# Patient Record
Sex: MALE | Race: WHITE | NOT HISPANIC OR LATINO | Employment: FULL TIME | URBAN - NONMETROPOLITAN AREA
[De-identification: names, ages, dates, MRNs, and addresses within clinical notes are randomized per-mention and may not be internally consistent; named-entity substitution may affect disease eponyms.]

---
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
## 2019-05-21 PROBLEM — S06.0X1A CONCUSSION WTH LOSS OF CONSCIOUSNESS OF 30 MINUTES OR LESS: Status: ACTIVE | Noted: 2019-05-21

## 2019-05-21 PROBLEM — S42.021A CLOSED DISPLACED FRACTURE OF SHAFT OF RIGHT CLAVICLE: Status: ACTIVE | Noted: 2019-05-21

## 2019-05-21 PROBLEM — R31.0 GROSS HEMATURIA: Status: ACTIVE | Noted: 2019-05-21

## 2023-04-17 ENCOUNTER — OFFICE VISIT (OUTPATIENT)
Dept: URGENT CARE | Facility: CLINIC | Age: 33
End: 2023-04-17
Payer: COMMERCIAL

## 2023-04-17 VITALS
SYSTOLIC BLOOD PRESSURE: 118 MMHG | RESPIRATION RATE: 18 BRPM | HEART RATE: 67 BPM | HEIGHT: 69 IN | BODY MASS INDEX: 25.18 KG/M2 | OXYGEN SATURATION: 98 % | TEMPERATURE: 98.6 F | WEIGHT: 170 LBS | DIASTOLIC BLOOD PRESSURE: 70 MMHG

## 2023-04-17 DIAGNOSIS — H66.001 NON-RECURRENT ACUTE SUPPURATIVE OTITIS MEDIA OF RIGHT EAR WITHOUT SPONTANEOUS RUPTURE OF TYMPANIC MEMBRANE: ICD-10-CM

## 2023-04-17 PROCEDURE — 99213 OFFICE O/P EST LOW 20 MIN: CPT | Performed by: PHYSICIAN ASSISTANT

## 2023-04-17 RX ORDER — FLUTICASONE PROPIONATE 50 MCG
1 SPRAY, SUSPENSION (ML) NASAL DAILY
Qty: 16 G | Refills: 0 | Status: SHIPPED | OUTPATIENT
Start: 2023-04-17

## 2023-04-17 RX ORDER — AMOXICILLIN AND CLAVULANATE POTASSIUM 875; 125 MG/1; MG/1
1 TABLET, FILM COATED ORAL 2 TIMES DAILY
Qty: 14 TABLET | Refills: 0 | Status: SHIPPED | OUTPATIENT
Start: 2023-04-17 | End: 2023-04-24

## 2023-04-17 ASSESSMENT — ENCOUNTER SYMPTOMS
EYE PAIN: 0
EYE REDNESS: 0
DIZZINESS: 0
DIAPHORESIS: 0
SHORTNESS OF BREATH: 0
NAUSEA: 0
FEVER: 0
HEADACHES: 0
CHILLS: 0
EYE DISCHARGE: 0
DIARRHEA: 0
SINUS PAIN: 0
COUGH: 0
SORE THROAT: 0
CONSTIPATION: 0
WHEEZING: 0
VOMITING: 0
ABDOMINAL PAIN: 0

## 2023-04-17 ASSESSMENT — FIBROSIS 4 INDEX: FIB4 SCORE: 0.69
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
## 2023-04-26 ENCOUNTER — OFFICE VISIT (OUTPATIENT)
Dept: URGENT CARE | Facility: CLINIC | Age: 33
End: 2023-04-26
Payer: COMMERCIAL

## 2023-04-26 VITALS
BODY MASS INDEX: 25.33 KG/M2 | RESPIRATION RATE: 16 BRPM | HEART RATE: 78 BPM | HEIGHT: 69 IN | OXYGEN SATURATION: 100 % | WEIGHT: 171 LBS | DIASTOLIC BLOOD PRESSURE: 76 MMHG | SYSTOLIC BLOOD PRESSURE: 116 MMHG | TEMPERATURE: 98.7 F

## 2023-04-26 DIAGNOSIS — H65.191 ACUTE EFFUSION OF RIGHT EAR: ICD-10-CM

## 2023-04-26 DIAGNOSIS — H66.004 RECURRENT ACUTE SUPPURATIVE OTITIS MEDIA OF RIGHT EAR WITHOUT SPONTANEOUS RUPTURE OF TYMPANIC MEMBRANE: ICD-10-CM

## 2023-04-26 DIAGNOSIS — H92.01 RIGHT EAR PAIN: ICD-10-CM

## 2023-04-26 PROCEDURE — 99213 OFFICE O/P EST LOW 20 MIN: CPT | Performed by: NURSE PRACTITIONER

## 2023-04-26 RX ORDER — METHYLPREDNISOLONE 4 MG/1
TABLET ORAL
Qty: 21 TABLET | Refills: 0 | Status: SHIPPED | OUTPATIENT
Start: 2023-04-26

## 2023-04-26 RX ORDER — CEFDINIR 300 MG/1
300 CAPSULE ORAL 2 TIMES DAILY
Qty: 20 CAPSULE | Refills: 0 | Status: SHIPPED | OUTPATIENT
Start: 2023-04-26 | End: 2023-05-06

## 2023-04-26 ASSESSMENT — ENCOUNTER SYMPTOMS
FEVER: 0
DIZZINESS: 0
SORE THROAT: 0
COUGH: 0

## 2023-04-26 ASSESSMENT — FIBROSIS 4 INDEX: FIB4 SCORE: 0.69
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
## 2023-04-26 NOTE — PATIENT INSTRUCTIONS
-Take antibiotic as directed.  -Oral hydration.  -Ibuprofen or tylenol as directed for pain and/or fevers.   -Over the counter antihistamine (cetrizine or benadryl).  -Follow up with primary care provider.    Follow up for failure to improve, worsening symptoms, persistent fevers, ear drainage, persistent or increased pain, severe headache or stiff neck, hearing changes, or any other concerns.

## 2023-07-13 ENCOUNTER — APPOINTMENT (OUTPATIENT)
Dept: RADIOLOGY | Facility: IMAGING CENTER | Age: 33
End: 2023-07-13
Attending: STUDENT IN AN ORGANIZED HEALTH CARE EDUCATION/TRAINING PROGRAM
Payer: COMMERCIAL

## 2023-07-13 ENCOUNTER — OFFICE VISIT (OUTPATIENT)
Dept: URGENT CARE | Facility: CLINIC | Age: 33
End: 2023-07-13
Payer: COMMERCIAL

## 2023-07-13 VITALS
TEMPERATURE: 97.6 F | WEIGHT: 170.6 LBS | HEIGHT: 69 IN | OXYGEN SATURATION: 97 % | HEART RATE: 72 BPM | BODY MASS INDEX: 25.27 KG/M2 | DIASTOLIC BLOOD PRESSURE: 62 MMHG | RESPIRATION RATE: 14 BRPM | SYSTOLIC BLOOD PRESSURE: 102 MMHG

## 2023-07-13 DIAGNOSIS — S69.92XA INJURY OF LEFT INDEX FINGER, INITIAL ENCOUNTER: ICD-10-CM

## 2023-07-13 DIAGNOSIS — H66.92 ACUTE LEFT OTITIS MEDIA: ICD-10-CM

## 2023-07-13 DIAGNOSIS — S60.451A FOREIGN BODY OF LEFT INDEX FINGER: ICD-10-CM

## 2023-07-13 PROCEDURE — 99214 OFFICE O/P EST MOD 30 MIN: CPT | Performed by: STUDENT IN AN ORGANIZED HEALTH CARE EDUCATION/TRAINING PROGRAM

## 2023-07-13 PROCEDURE — 73140 X-RAY EXAM OF FINGER(S): CPT | Mod: TC,LT | Performed by: STUDENT IN AN ORGANIZED HEALTH CARE EDUCATION/TRAINING PROGRAM

## 2023-07-13 PROCEDURE — 3078F DIAST BP <80 MM HG: CPT | Performed by: STUDENT IN AN ORGANIZED HEALTH CARE EDUCATION/TRAINING PROGRAM

## 2023-07-13 PROCEDURE — 3074F SYST BP LT 130 MM HG: CPT | Performed by: STUDENT IN AN ORGANIZED HEALTH CARE EDUCATION/TRAINING PROGRAM

## 2023-07-13 RX ORDER — AMOXICILLIN AND CLAVULANATE POTASSIUM 875; 125 MG/1; MG/1
1 TABLET, FILM COATED ORAL 2 TIMES DAILY
Qty: 14 TABLET | Refills: 0 | Status: SHIPPED | OUTPATIENT
Start: 2023-07-13 | End: 2023-07-20

## 2023-07-13 ASSESSMENT — FIBROSIS 4 INDEX: FIB4 SCORE: 0.71

## 2023-07-13 NOTE — PROGRESS NOTES
Subjective:   Get Carroll is a 33 y.o. male who presents for Otalgia (L ear pain , muffled x 4 days )      HPI:  Pleasant 33-year-old male presents the urgent care with 2 different complaints.  First issue is 4 days of left ear pain.  Denies any recent swimming or trauma.  He states that it feels like an achy sensation.  He does report having a couple different ear infections over the past year.  Denies any history of ear infections prior to this except for when he was a kid.  His other issue is reduced range of motion without known injury to his left index finger.  States that his index finger feels swollen and tight when he tries to make a fist.  He states that he cannot fully make a fist.  He has not used anything for symptoms at this time.  Denies any past history of similar issues.  No history of trigger finger.  Denies his finger ever catching and releasing.  No history of Dupuytren's contractures.  He does report tingling to the entirety of the finger but is still able to feel.  No burning, laceration, crush injury, for hearing loss, tinnitus, ear discharge, sore throat, nasal congestion, sinus pressure, or sinus pain.    Medications:    fluticasone  ibuprofen Tabs  methylPREDNISolone Tbpk    Allergies: Latex and Milk products food allergy    Problem List: Get Carroll does not have any pertinent problems on file.    Surgical History:  Past Surgical History:   Procedure Laterality Date    PB OPEN TREATMENT CLAVICULAR FRACTURE INTERNAL * Right 5/28/2019    Procedure: OPEN REDUCTION INTERNAL FIXATION RIGHT CLAVICLE FRACTURE;  Surgeon: Blayne Archer M.D.;  Location: SURGERY [facility];  Service: Orthopedics    ORIF, FRACTURE, CLAVICLE Left        Past Social Hx: Get Carroll  reports that he has never smoked. He has never used smokeless tobacco. He reports current alcohol use. He reports that he does not use drugs.     Past Family Hx:  Get Carroll family history is not on file.     Problem list,  "medications, and allergies reviewed by myself today in Epic.     Objective:     /62 (BP Location: Left arm, Patient Position: Sitting, BP Cuff Size: Adult)   Pulse 72   Temp 36.4 °C (97.6 °F) (Temporal)   Resp 14   Ht 1.753 m (5' 9\")   Wt 77.4 kg (170 lb 9.6 oz)   SpO2 97%   BMI 25.19 kg/m²     Physical Exam  Vitals reviewed.   Constitutional:       General: He is not in acute distress.     Appearance: Normal appearance.   HENT:      Head: Normocephalic.      Right Ear: Hearing, tympanic membrane, ear canal and external ear normal.      Left Ear: Hearing, ear canal and external ear normal. No drainage.  No middle ear effusion. No mastoid tenderness. Tympanic membrane is injected and erythematous. Tympanic membrane is not perforated or bulging.      Ears:      Comments: Left ear: No tragal tenderness present.  No erythema or swelling to the ear canal.     Nose: Nose normal. No congestion or rhinorrhea.      Mouth/Throat:      Mouth: Mucous membranes are moist.      Pharynx: No oropharyngeal exudate or posterior oropharyngeal erythema.   Eyes:      Conjunctiva/sclera: Conjunctivae normal.      Pupils: Pupils are equal, round, and reactive to light.   Cardiovascular:      Rate and Rhythm: Normal rate.      Pulses: Normal pulses.   Pulmonary:      Effort: Pulmonary effort is normal.   Musculoskeletal:      Comments: Left index finger: No discernible swelling to the left index finger when compared to the right side.  No erythema or ecchymosis.  No paronychia.  Reduced active range of motion with making a fist.  Able to fully extend the index finger without pain.  Passive full extension without pain.  No pain to the flexor tendon distribution with palpation.  He does report some TTP to the dorsal aspect of the DIP joint and PIP joint.  Finger is not catching during passive range of motion.  I am able to fully move the finger through its passive range of motion.  No palpable cords/Dupuytren contracture seen on " exam.  Cap refill less than 2 seconds.  Subjective reduced sensation to the full distribution of the finger.  2+ radial pulse.  Normal temperature to the finger.  Normal coloration.  No pain or swelling to the pad of the finger.  No laceration or wound.   Skin:     General: Skin is warm and dry.   Neurological:      Mental Status: He is alert.         Assessment/Plan:     Diagnosis and associated orders:     1. Acute left otitis media  amoxicillin-clavulanate (AUGMENTIN) 875-125 MG Tab      2. Injury of left index finger, initial encounter  DX-FINGER(S) 2+ LEFT    Referral to Hand Surgery      3. Foreign body of left index finger  Referral to Hand Surgery         Comments/MDM:     Patient's first issue is acute left otitis media.  We will treat this with Augmentin.  This does appear to be early infection without significant middle ear effusion or bulging to the TM.  No perforation.  No otitis externa.  Patient denies any known allergies to Augmentin.    Second issue is acute reduced range of motion to the left index finger without known cause.  He states that over the past 24 hours he has had reduced range of motion with the left index finger with regards to making a full fist.  He denies any trauma or injury.  Physical exam shows no obvious trigger finger or signs of Dupuytren's contracture.  No swelling or erythema to the finger.  No tenderness over the flexor tendon.  Physical exam is not consistent with infectious tenosynovitis.  X-ray shows no acute trauma or injury.  There is a small punctate foreign body to the base of the second finger on the radial aspect.  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  Palpation to the area does not exhibit any tenderness.  Given unclear etiology we will refer to orthopedics for further evaluation.  Discussed signs of infectious tenosynovitis.  If any of these signs occur including increasing swelling, redness, pain  with passive extension, fever, or acute worsening of symptoms he should present to the ED.  He is neurovascular intact at this time.  Good pulses and cap refill.  He does have some subjective numbness but no change in coloration and normal temperature in the finger.         Differential diagnosis, natural history, supportive care, and indications for immediate follow-up discussed.    Advised the patient to follow-up with the primary care physician for recheck, reevaluation, and consideration of further management.    Please note that this dictation was created using voice recognition software. I have made a reasonable attempt to correct obvious errors, but I expect that there are errors of grammar and possibly content that I did not discover before finalizing the note.    Electronically signed by Viraj Cooper PA-C.

## 2023-08-09 ENCOUNTER — HOSPITAL ENCOUNTER (OUTPATIENT)
Facility: MEDICAL CENTER | Age: 33
End: 2023-08-09
Attending: PREVENTIVE MEDICINE
Payer: COMMERCIAL

## 2023-08-09 ENCOUNTER — NON-PROVIDER VISIT (OUTPATIENT)
Dept: OCCUPATIONAL MEDICINE | Facility: CLINIC | Age: 33
End: 2023-08-09

## 2023-08-09 ENCOUNTER — OFFICE VISIT (OUTPATIENT)
Dept: OCCUPATIONAL MEDICINE | Facility: CLINIC | Age: 33
End: 2023-08-09

## 2023-08-09 DIAGNOSIS — Z01.89 RESPIRATORY CLEARANCE EXAMINATION, ENCOUNTER FOR: ICD-10-CM

## 2023-08-09 DIAGNOSIS — Z02.89 ENCOUNTER FOR OCCUPATIONAL HEALTH ASSESSMENT: ICD-10-CM

## 2023-08-09 DIAGNOSIS — Z02.89 ENCOUNTER FOR OCCUPATIONAL HEALTH ASSESSMENT: Primary | ICD-10-CM

## 2023-08-09 PROCEDURE — 8916 PR CLEARANCE ONLY: Performed by: NURSE PRACTITIONER

## 2023-08-11 LAB — LEAD BLDV-MCNC: <2 UG/DL

## 2023-08-28 ENCOUNTER — OFFICE VISIT (OUTPATIENT)
Dept: URGENT CARE | Facility: CLINIC | Age: 33
End: 2023-08-28
Payer: COMMERCIAL

## 2023-08-28 ENCOUNTER — APPOINTMENT (OUTPATIENT)
Dept: RADIOLOGY | Facility: IMAGING CENTER | Age: 33
End: 2023-08-28
Attending: NURSE PRACTITIONER
Payer: COMMERCIAL

## 2023-08-28 VITALS
HEIGHT: 69 IN | RESPIRATION RATE: 18 BRPM | HEART RATE: 86 BPM | WEIGHT: 167 LBS | BODY MASS INDEX: 24.73 KG/M2 | TEMPERATURE: 97.6 F | DIASTOLIC BLOOD PRESSURE: 76 MMHG | SYSTOLIC BLOOD PRESSURE: 126 MMHG | OXYGEN SATURATION: 98 %

## 2023-08-28 DIAGNOSIS — S61.215A LACERATION OF LEFT RING FINGER WITHOUT DAMAGE TO NAIL, FOREIGN BODY PRESENCE UNSPECIFIED, INITIAL ENCOUNTER: ICD-10-CM

## 2023-08-28 DIAGNOSIS — S69.92XA INJURY OF FINGER OF LEFT HAND, INITIAL ENCOUNTER: ICD-10-CM

## 2023-08-28 PROCEDURE — 90471 IMMUNIZATION ADMIN: CPT | Performed by: NURSE PRACTITIONER

## 2023-08-28 PROCEDURE — 12001 RPR S/N/AX/GEN/TRNK 2.5CM/<: CPT | Mod: F3 | Performed by: NURSE PRACTITIONER

## 2023-08-28 PROCEDURE — 73140 X-RAY EXAM OF FINGER(S): CPT | Mod: TC,LT | Performed by: NURSE PRACTITIONER

## 2023-08-28 PROCEDURE — 90715 TDAP VACCINE 7 YRS/> IM: CPT | Performed by: NURSE PRACTITIONER

## 2023-08-28 PROCEDURE — 99213 OFFICE O/P EST LOW 20 MIN: CPT | Mod: 25 | Performed by: NURSE PRACTITIONER

## 2023-08-28 PROCEDURE — 3078F DIAST BP <80 MM HG: CPT | Performed by: NURSE PRACTITIONER

## 2023-08-28 PROCEDURE — 3074F SYST BP LT 130 MM HG: CPT | Performed by: NURSE PRACTITIONER

## 2023-08-28 RX ORDER — CEPHALEXIN 500 MG/1
500 CAPSULE ORAL 2 TIMES DAILY
Qty: 10 CAPSULE | Refills: 0 | Status: SHIPPED | OUTPATIENT
Start: 2023-08-28 | End: 2023-09-02

## 2023-08-28 NOTE — PROGRESS NOTES
Date: 08/28/23     Chief Complaint:    Chief Complaint   Patient presents with    Laceration     Left hand ring finger x 2 hours         History of Present Illness: 33 y.o.  male presents to clinic with left ring finger laceration that happened 2 hours ago.  Patient states he was working on a  and did accidentally cut his finger.  He states he feels the laceration is pretty deep.  He does not believe he is up-to-date on his tetanus vaccination.  He denies any decreased range of motion or numbness or tingling distally.      ROS:    As stated in HPI     Medical/SX/ Social History:  Reviewed per chart    Pertinent Medications:    Current Outpatient Medications on File Prior to Visit   Medication Sig Dispense Refill    methylPREDNISolone (MEDROL DOSEPAK) 4 MG Tablet Therapy Pack Follow schedule on package instructions. (Patient not taking: Reported on 7/13/2023) 21 Tablet 0    fluticasone (FLONASE) 50 MCG/ACT nasal spray Administer 1 Spray into affected nostril(S) every day. (Patient not taking: Reported on 8/28/2023) 16 g 0    ibuprofen (MOTRIN) 800 MG Tab Take 1 Tab by mouth every 8 hours as needed. (Patient not taking: Reported on 8/28/2023) 30 Tab 3     No current facility-administered medications on file prior to visit.        Allergies:    Latex and Milk products food allergy     Problem list, medications, and allergies reviewed by myself today in Epic     Physical Exam:    Vitals:    08/28/23 1443   BP: 126/76   Pulse: 86   Resp: 18   Temp: 36.4 °C (97.6 °F)   SpO2: 98%             Physical Exam  Constitutional:       Appearance: Normal appearance.   HENT:      Head: Normocephalic and atraumatic.   Musculoskeletal:      Comments: Laceration to the left fourth finger overlying the PIP.  Actively bleeding.  Distal neurovascular status is grossly intact with cap refill less than 3 seconds.  Extensor tendons intact to resistance.  Range of motion intact.   Neurological:      Mental Status: He is alert.             LACERATION PROCEDURE NOTE:    Procedure explained and verbal consent obtained. The wound was anesthetized with  1.5 mL of lidocaine without epi, with good anesthesia. Sterile drape and prep were done. Copious irrigation was done with saline and the wound was explored. Length of wound was visualized at approximately: 2.5 cm. There was no visualized foreign body or deep structure injury noted. Wound was examined under range of motion, range of motion intact. Wound edges were approximated with good alignment using sutures.  4 sutures were placed using 5-0 Ethilon.  Nonstick dressing applied. Patient tolerated procedure well with no complications.       Diagnostics:      DX-FINGER(S) 2+ LEFT    Result Date: 8/28/2023 8/28/2023 2:55 PM HISTORY/REASON FOR EXAM:  Pain/Deformity Following Trauma; gribder vs finger pip. . TECHNIQUE/EXAM DESCRIPTION AND NUMBER OF VIEWS:  3 views of the LEFT fingers. COMPARISON: 7/13/2023 FINDINGS: There is no fracture or dislocation. The visualized osseous structures are in anatomic alignment. The joint spaces are grossly preserved. Bone mineralization is age-appropriate.. There is a punctate foreign body within the base of the second finger in the soft tissues along the radial aspect.     No acute osseous abnormality of the ring finger or radiopaque foreign body.      Diagnostics interpreted by myself.      Medical Decision making and plan :  I personally reviewed prior external notes and test results pertinent to today's visit. Pt is clinically stable at today's acute urgent care visit.  Patient appears nontoxic with no acute distress noted. Appropriate for outpatient care at this time. The patient remained stable during the urgent care visit.     Pleasant 33 y.o. male presented clinic with with a laceration to his left fourth finger.  Due to the nature of the injury did obtain x-ray imaging to ensure no radiopaque foreign body as well as bony injury.  X-ray did show no acute osseous  abnormality of the left fourth finger.  Did show punctuate foreign body at the base of the second finger although this is known.  Using shared decision making did close laceration using stitches.  Discussed appropriate wound care return to clinic in 7 to 10 days for removal.  Shared decision-making was utilized with patient for treatment plan.  Differential Diagnosis, natural history, and supportive care discussed.      1. Laceration of left ring finger without damage to nail, foreign body presence unspecified, initial encounter    - Tdap =>6yo IM  - cephALEXin (KEFLEX) 500 MG Cap; Take 1 Capsule by mouth 2 times a day for 5 days.  Dispense: 10 Capsule; Refill: 0    2. Injury of finger of left hand, initial encounter    - DX-FINGER(S) 2+ LEFT        Medication discussed included indication for use and the potential benefits and side effects. Education was provided regarding the aforementioned assessments.  All of the patient's questions were answered to their satisfaction at the time of discharge. Patient was encouraged to monitor symptoms closely. Those signs and symptoms which would warrant concern and mandate seeking a higher level of service through the emergency department discussed at length.  Patient stated agreement and understanding of this plan of care.    Disposition:  Home in stable condition       Voice Recognition Disclaimer:  Portions of this document were created using voice recognition software. The software does have a chance of producing errors of grammar and possibly content. I have made every reasonable attempt to correct obvious errors, but there may be errors of grammar and possibly content that I did not discover before finalizing the documentation.    OTILIA Lew.

## 2023-09-19 ENCOUNTER — OFFICE VISIT (OUTPATIENT)
Dept: URGENT CARE | Facility: CLINIC | Age: 33
End: 2023-09-19
Payer: COMMERCIAL

## 2023-09-19 VITALS
WEIGHT: 166 LBS | HEIGHT: 69 IN | TEMPERATURE: 97.9 F | SYSTOLIC BLOOD PRESSURE: 118 MMHG | OXYGEN SATURATION: 96 % | DIASTOLIC BLOOD PRESSURE: 68 MMHG | HEART RATE: 90 BPM | RESPIRATION RATE: 18 BRPM | BODY MASS INDEX: 24.59 KG/M2

## 2023-09-19 DIAGNOSIS — L72.0 EPIDERMOID CYST OF FINGER OF LEFT HAND: ICD-10-CM

## 2023-09-19 DIAGNOSIS — L08.9 FINGER INFECTION: ICD-10-CM

## 2023-09-19 PROCEDURE — 3078F DIAST BP <80 MM HG: CPT | Performed by: NURSE PRACTITIONER

## 2023-09-19 PROCEDURE — 3074F SYST BP LT 130 MM HG: CPT | Performed by: NURSE PRACTITIONER

## 2023-09-19 PROCEDURE — 99213 OFFICE O/P EST LOW 20 MIN: CPT | Performed by: NURSE PRACTITIONER

## 2023-09-19 RX ORDER — DOXYCYCLINE HYCLATE 100 MG
100 TABLET ORAL 2 TIMES DAILY
Qty: 14 TABLET | Refills: 0 | Status: SHIPPED | OUTPATIENT
Start: 2023-09-19 | End: 2023-09-26

## 2023-09-20 NOTE — PROGRESS NOTES
Date: 09/19/23     Chief Complaint:    Chief Complaint   Patient presents with    Hand Injury     Left hand , ring finger         History of Present Illness: 33 y.o.  male presents to clinic with concerns for infection of his left ring finger.  Patient was seen approximately 3 weeks ago for a laceration of his left ring finger.  He obtained sutures.  He states that about day 6 the sutures did come out on their own.  He did take the prophylactic antibiotics as prescribed he did finish them in their entirety.  He states over the past 5 days he has noticed increased swelling, pain to palpation and redness.  He denies any fever or body aches.  He denies any numbness or tingling distally.      ROS:    As stated in HPI     Medical/SX/ Social History:  Reviewed per chart    Pertinent Medications:    Current Outpatient Medications on File Prior to Visit   Medication Sig Dispense Refill    methylPREDNISolone (MEDROL DOSEPAK) 4 MG Tablet Therapy Pack Follow schedule on package instructions. (Patient not taking: Reported on 7/13/2023) 21 Tablet 0    fluticasone (FLONASE) 50 MCG/ACT nasal spray Administer 1 Spray into affected nostril(S) every day. (Patient not taking: Reported on 8/28/2023) 16 g 0    ibuprofen (MOTRIN) 800 MG Tab Take 1 Tab by mouth every 8 hours as needed. (Patient not taking: Reported on 8/28/2023) 30 Tab 3     No current facility-administered medications on file prior to visit.        Allergies:    Latex and Milk products food allergy     Problem list, medications, and allergies reviewed by myself today in Epic     Physical Exam:    Vitals:    09/19/23 1716   BP: 118/68   Pulse: 90   Resp: 18   Temp: 36.6 °C (97.9 °F)   SpO2: 96%             Physical Exam  Constitutional:       Appearance: Normal appearance.   HENT:      Head: Normocephalic and atraumatic.   Musculoskeletal:        Hands:    Neurological:      Mental Status: He is alert.            Medical Decision making and plan :  I personally reviewed  prior external notes and test results pertinent to today's visit. Pt is clinically stable at today's acute urgent care visit.  Patient appears nontoxic with no acute distress noted. Appropriate for outpatient care at this time. The patient remained stable during the urgent care visit.     Pleasant 33 y.o. male presented clinic with concerns for infection 3 weeks post laceration repair.  Patient did undergo x-ray imaging at that time there was no foreign body.  Shared decision-making was utilized with patient for treatment plan.  Differential Diagnosis, natural history, and supportive care discussed.  Swelling is very fluctuant in nature which is concerning for possible abscess.  Did discuss with patient that it is common to form cysts over the finger joints as well after trauma.  Although on exam the swelling does not feel rubbery in nature.  Patient does wish to have the fluid removed in clinic.  Using shared decision making I did cleanse the area thoroughly.  Using an 18-gauge needle I did advance the needle into the most fluctuant area.  When attempting to pull back on the syringe minimal clear fluid was drained.  Once the needle was removed clear fluid did drain although there is still significant fluctuance noted.  Discussed with patient that the swelling although I do group agree that it is infected and needs antibiotics is likely secondary to a fluid-filled cyst.  Advised that he would need to have the full encapsulated cyst removed to decrease the chance of recurrence.  Patient has already had 5 days of Keflex, did send for 7 days of doxycycline.  Advised to finish antibiotics in their entirety.    1. Finger infection    - doxycycline (VIBRAMYCIN) 100 MG Tab; Take 1 Tablet by mouth 2 times a day for 7 days.  Dispense: 14 Tablet; Refill: 0    2. Epidermoid cyst of finger of left hand    - Referral to General Surgery      Medication discussed included indication for use and the potential benefits and side  effects. Education was provided regarding the aforementioned assessments.  All of the patient's questions were answered to their satisfaction at the time of discharge. Patient was encouraged to monitor symptoms closely. Those signs and symptoms which would warrant concern and mandate seeking a higher level of service through the emergency department discussed at length.  Patient stated agreement and understanding of this plan of care.    Disposition:  Home in stable condition       Voice Recognition Disclaimer:  Portions of this document were created using voice recognition software. The software does have a chance of producing errors of grammar and possibly content. I have made every reasonable attempt to correct obvious errors, but there may be errors of grammar and possibly content that I did not discover before finalizing the documentation.    OTILIA Lew.

## 2024-03-30 ENCOUNTER — OFFICE VISIT (OUTPATIENT)
Dept: URGENT CARE | Facility: CLINIC | Age: 34
End: 2024-03-30
Payer: COMMERCIAL

## 2024-03-30 VITALS
HEIGHT: 69 IN | OXYGEN SATURATION: 99 % | HEART RATE: 84 BPM | DIASTOLIC BLOOD PRESSURE: 68 MMHG | WEIGHT: 155 LBS | RESPIRATION RATE: 16 BRPM | BODY MASS INDEX: 22.96 KG/M2 | TEMPERATURE: 98.8 F | SYSTOLIC BLOOD PRESSURE: 110 MMHG

## 2024-03-30 DIAGNOSIS — L03.011 PARONYCHIA OF RIGHT MIDDLE FINGER: ICD-10-CM

## 2024-03-30 RX ORDER — CEPHALEXIN 500 MG/1
500 CAPSULE ORAL 4 TIMES DAILY
Qty: 28 CAPSULE | Refills: 0 | Status: SHIPPED | OUTPATIENT
Start: 2024-03-30 | End: 2024-04-06

## 2024-03-30 ASSESSMENT — ENCOUNTER SYMPTOMS
CONSTITUTIONAL NEGATIVE: 1
FEVER: 0
NEUROLOGICAL NEGATIVE: 1
ROS SKIN COMMENTS: PER HPI
CHILLS: 0
MUSCULOSKELETAL NEGATIVE: 1

## 2024-03-30 ASSESSMENT — VISUAL ACUITY: OU: 1
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
## 2024-04-01 ENCOUNTER — OFFICE VISIT (OUTPATIENT)
Dept: URGENT CARE | Facility: CLINIC | Age: 34
End: 2024-04-01
Payer: COMMERCIAL

## 2024-04-01 ENCOUNTER — HOSPITAL ENCOUNTER (OUTPATIENT)
Facility: MEDICAL CENTER | Age: 34
End: 2024-04-01
Attending: REGISTERED NURSE
Payer: COMMERCIAL

## 2024-04-01 VITALS
TEMPERATURE: 97.7 F | BODY MASS INDEX: 22.96 KG/M2 | DIASTOLIC BLOOD PRESSURE: 68 MMHG | WEIGHT: 155 LBS | RESPIRATION RATE: 16 BRPM | OXYGEN SATURATION: 97 % | HEIGHT: 69 IN | SYSTOLIC BLOOD PRESSURE: 118 MMHG | HEART RATE: 83 BPM

## 2024-04-01 DIAGNOSIS — L03.011 PARONYCHIA OF RIGHT MIDDLE FINGER: ICD-10-CM

## 2024-04-01 PROCEDURE — 87077 CULTURE AEROBIC IDENTIFY: CPT

## 2024-04-01 PROCEDURE — 87186 SC STD MICRODIL/AGAR DIL: CPT

## 2024-04-01 PROCEDURE — 3078F DIAST BP <80 MM HG: CPT | Performed by: REGISTERED NURSE

## 2024-04-01 PROCEDURE — 87070 CULTURE OTHR SPECIMN AEROBIC: CPT

## 2024-04-01 PROCEDURE — 3074F SYST BP LT 130 MM HG: CPT | Performed by: REGISTERED NURSE

## 2024-04-01 PROCEDURE — 87205 SMEAR GRAM STAIN: CPT

## 2024-04-01 PROCEDURE — 87147 CULTURE TYPE IMMUNOLOGIC: CPT

## 2024-04-01 PROCEDURE — 99214 OFFICE O/P EST MOD 30 MIN: CPT | Performed by: REGISTERED NURSE

## 2024-04-01 ASSESSMENT — ENCOUNTER SYMPTOMS
CHILLS: 0
FEVER: 0
MYALGIAS: 0

## 2024-04-02 DIAGNOSIS — L03.011 PARONYCHIA OF RIGHT MIDDLE FINGER: ICD-10-CM
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
## 2024-04-04 LAB
GRAM STN SPEC: NORMAL
SIGNIFICANT IND 70042: NORMAL
SITE SITE: NORMAL
SOURCE SOURCE: NORMAL

## 2024-04-05 LAB
BACTERIA WND AEROBE CULT: ABNORMAL
BACTERIA WND AEROBE CULT: ABNORMAL
GRAM STN SPEC: ABNORMAL
SIGNIFICANT IND 70042: ABNORMAL
SITE SITE: ABNORMAL
SOURCE SOURCE: ABNORMAL

## 2024-12-21 ENCOUNTER — OFFICE VISIT (OUTPATIENT)
Dept: URGENT CARE | Facility: CLINIC | Age: 34
End: 2024-12-21
Payer: COMMERCIAL

## 2024-12-21 VITALS
OXYGEN SATURATION: 97 % | TEMPERATURE: 98.8 F | WEIGHT: 155 LBS | SYSTOLIC BLOOD PRESSURE: 118 MMHG | HEIGHT: 69 IN | DIASTOLIC BLOOD PRESSURE: 52 MMHG | BODY MASS INDEX: 22.96 KG/M2 | RESPIRATION RATE: 16 BRPM | HEART RATE: 87 BPM

## 2024-12-21 DIAGNOSIS — J11.1 INFLUENZA: ICD-10-CM

## 2024-12-21 DIAGNOSIS — R68.89 FLU-LIKE SYMPTOMS: ICD-10-CM

## 2024-12-21 LAB
FLUAV RNA SPEC QL NAA+PROBE: POSITIVE
FLUBV RNA SPEC QL NAA+PROBE: NEGATIVE
RSV RNA SPEC QL NAA+PROBE: NEGATIVE
SARS-COV-2 RNA RESP QL NAA+PROBE: NEGATIVE

## 2024-12-21 PROCEDURE — 99213 OFFICE O/P EST LOW 20 MIN: CPT | Performed by: NURSE PRACTITIONER

## 2024-12-21 PROCEDURE — 0241U POCT CEPHEID COV-2, FLU A/B, RSV - PCR: CPT | Performed by: NURSE PRACTITIONER

## 2024-12-21 PROCEDURE — 3078F DIAST BP <80 MM HG: CPT | Performed by: NURSE PRACTITIONER

## 2024-12-21 PROCEDURE — 3074F SYST BP LT 130 MM HG: CPT | Performed by: NURSE PRACTITIONER

## 2024-12-21 RX ORDER — MELOXICAM 15 MG/1
1 TABLET ORAL
COMMUNITY

## 2024-12-21 RX ORDER — AZITHROMYCIN 250 MG/1
TABLET, FILM COATED ORAL
COMMUNITY

## 2024-12-21 RX ORDER — DOXYCYCLINE HYCLATE 100 MG
1 TABLET ORAL 2 TIMES DAILY
COMMUNITY

## 2024-12-21 RX ORDER — CEPHALEXIN 500 MG/1
CAPSULE ORAL
COMMUNITY

## 2024-12-21 RX ORDER — OSELTAMIVIR PHOSPHATE 75 MG/1
75 CAPSULE ORAL 2 TIMES DAILY
Qty: 10 CAPSULE | Refills: 0 | Status: SHIPPED | OUTPATIENT
Start: 2024-12-21

## 2024-12-21 NOTE — PROGRESS NOTES
Verbal consent was acquired by the patient to use [employer] ambient listening note generation during this visit     Date: 12/21/24        Chief Complaint   Patient presents with    Other     Patient coming in for cold/ flu, headaches,  fever,  body aches x 2 days           History of Present Illness  The patient is a 34-year-old male who presents for evaluation of sore throat, fever, chills, body aches, and cough.    He reports the onset of symptoms, including sore throat, fever, chills, body aches, and cough, since Saturday. He also experiences chest pressure. He has attempted symptomatic relief with Tylenol, but it has not been effective.    MEDICATIONS  Tylenol         ROS:    No severe shortness of breath   No cardiac like chest pain, as discussed   As otherwise stated in HPI    Medical/SX/ Social History:  Reviewed per chart    Pertinent Medications:    Current Outpatient Medications on File Prior to Visit   Medication Sig Dispense Refill    azithromycin (ZITHROMAX) 250 MG Tab Take 2 tabs on day one then take 1 po qd x 4 days (Patient not taking: Reported on 12/21/2024)      cephALEXin (KEFLEX) 500 MG Cap  (Patient not taking: Reported on 12/21/2024)      doxycycline (VIBRAMYCIN) 100 MG Tab Take 1 Tablet by mouth 2 times a day. (Patient not taking: Reported on 12/21/2024)      meloxicam (MOBIC) 15 MG tablet Take 1 Tablet by mouth every day. (Patient not taking: Reported on 12/21/2024)       No current facility-administered medications on file prior to visit.        Allergies:    Latex and Milk products food allergy     Problem list, medications, and allergies reviewed by myself today in Epic     Physical Exam:    Vitals:    12/21/24 0918   BP: 118/52   Pulse: 87   Resp: 16   Temp: 37.1 °C (98.8 °F)   SpO2: 97%             Physical Exam  Vitals and nursing note reviewed.   Constitutional:       General: He is not in acute distress.     Appearance: Normal appearance. He is ill-appearing. He is not  toxic-appearing.   HENT:      Head: Normocephalic and atraumatic.      Right Ear: Tympanic membrane, ear canal and external ear normal.      Left Ear: Tympanic membrane, ear canal and external ear normal.      Nose: Congestion and rhinorrhea present.      Mouth/Throat:      Mouth: Mucous membranes are moist.      Pharynx: Oropharynx is clear. No posterior oropharyngeal erythema.   Eyes:      Extraocular Movements: Extraocular movements intact.      Conjunctiva/sclera: Conjunctivae normal.      Pupils: Pupils are equal, round, and reactive to light.   Cardiovascular:      Rate and Rhythm: Normal rate and regular rhythm.      Pulses: Normal pulses.      Heart sounds: Normal heart sounds.   Pulmonary:      Effort: Pulmonary effort is normal.      Breath sounds: No wheezing, rhonchi or rales.   Musculoskeletal:         General: Normal range of motion.      Cervical back: Normal range of motion and neck supple. No tenderness.   Lymphadenopathy:      Cervical: No cervical adenopathy.   Skin:     General: Skin is warm.      Capillary Refill: Capillary refill takes less than 2 seconds.   Neurological:      General: No focal deficit present.      Mental Status: He is alert and oriented to person, place, and time.            Diagnostics:    Results for orders placed or performed in visit on 12/21/24   POCT Cepheid CoV-2, Flu A/B, RSV - PCR    Collection Time: 12/21/24 10:04 AM   Result Value Ref Range    SARS-CoV-2 by PCR Negative Negative, Invalid    Influenza virus A RNA Positive (A) Negative, Invalid    Influenza virus B, PCR Negative Negative, Invalid    RSV, PCR Negative Negative, Invalid         Medical Decision making and plan :  I personally reviewed prior external notes and test results pertinent to today's visit. Pt is clinically stable at today's acute urgent care visit.  Patient appears nontoxic with no acute distress noted. Appropriate for outpatient care at this time.    Pleasant 34 y.o. male presented clinic  with:     Assessment & Plan  1. Influenza A.  Advised patient symptoms are viral in etiology, recommend supportive care. Increased fluids and rest.  Recommend over-the-counter cold and flu medications and Tylenol and/or ibuprofen for symptomatic relief and fevers.  Discussed use of nedi-pot, humidifier, and Flonase nasal spray as well.  Discussed good hand hygiene and ways to decrease spread of disease.  Follow-up with PCP return for reevaluation if symptoms persist/worsen.  Patient offered discharge instructions regarding flu.  The patient demonstrated a good understanding and agreed with the treatment plan.         Shared decision-making was utilized with patient for treatment plan. Medication discussed included indication for use and the potential benefits and side effects. Education was provided regarding the aforementioned assessments.  Differential Diagnosis, natural history, and supportive care discussed. All of the patient's questions were answered to their satisfaction at the time of discharge. Patient was encouraged to monitor symptoms closely. Those signs and symptoms which would warrant concern and mandate seeking a higher level of service through the emergency department discussed at length.  Patient stated agreement and understanding of this plan of care.    Disposition:  Home in stable condition     Voice Recognition Disclaimer:  Portions of this document were created using voice recognition software and [employer] technology provided by [employer]. The software does have a chance of producing errors of grammar and possibly content. I have made every reasonable attempt to correct obvious errors, but there may be errors of grammar and possibly content that I did not discover before finalizing the  documentation.    OTILIA Krause.